# Patient Record
Sex: MALE | Race: WHITE | Employment: STUDENT | URBAN - METROPOLITAN AREA
[De-identification: names, ages, dates, MRNs, and addresses within clinical notes are randomized per-mention and may not be internally consistent; named-entity substitution may affect disease eponyms.]

---

## 2017-08-16 ENCOUNTER — HOSPITAL ENCOUNTER (OUTPATIENT)
Age: 22
Discharge: HOME | End: 2017-08-16
Attending: FAMILY MEDICINE
Payer: COMMERCIAL

## 2017-08-16 VITALS
TEMPERATURE: 98 F | HEIGHT: 73 IN | RESPIRATION RATE: 16 BRPM | OXYGEN SATURATION: 100 % | WEIGHT: 200 LBS | HEART RATE: 91 BPM | DIASTOLIC BLOOD PRESSURE: 85 MMHG | SYSTOLIC BLOOD PRESSURE: 146 MMHG | BODY MASS INDEX: 26.51 KG/M2

## 2017-08-16 DIAGNOSIS — J02.0 STREPTOCOCCAL SORE THROAT: Primary | ICD-10-CM

## 2017-08-16 LAB — S PYO AG THROAT QL: POSITIVE

## 2017-08-16 PROCEDURE — 87430 STREP A AG IA: CPT

## 2017-08-16 PROCEDURE — 99203 OFFICE O/P NEW LOW 30 MIN: CPT

## 2017-08-16 PROCEDURE — 99204 OFFICE O/P NEW MOD 45 MIN: CPT

## 2017-08-16 RX ORDER — AMOXICILLIN 875 MG/1
875 TABLET, COATED ORAL 2 TIMES DAILY
Qty: 20 TABLET | Refills: 0 | Status: SHIPPED | OUTPATIENT
Start: 2017-08-16 | End: 2017-08-26

## 2017-08-16 NOTE — ED PROVIDER NOTES
Patient Seen in: [address]    History   Patient presents with:  Cough/URI    Stated Complaint: coughing up blood, congested, sinus pressure, headache      Patient Seen in: [address] phaynx injected, mild  LUNGS: clear  CARDIO: RRR without murmur  EXTREMITIES: no cyanosis, clubbing or edema  GI: soft, non-tender, normal bowel sounds  SKIN: good skin turgor, no obvious rashes  Differential to include: URI vs. rhinonsinusitis vs. Bronchi from today and agreed except as otherwise stated in HPI.     Physical Exam   ED Triage Vitals [08/16/17 1033]  BP: 146/85  Pulse: 91  Resp: 16  Temp: 98 °F (36.7 °C)  Temp src: Oral  SpO2: 100 %  O2 Device: None (Room air)    Current:/85   Pulse 91

## 2017-08-16 NOTE — ED INITIAL ASSESSMENT (HPI)
Pt presents to the IC with c/o a cough, congestion, sore throat, having blood in his spit and having a headache. Pt notes back aches. Pt denies fever but notes chills today. Symptom onset was last night/ early this morning.

## 2019-07-30 ENCOUNTER — HOSPITAL ENCOUNTER (OUTPATIENT)
Age: 24
Discharge: HOME | End: 2019-07-30
Attending: FAMILY MEDICINE
Payer: COMMERCIAL

## 2019-07-30 ENCOUNTER — APPOINTMENT (OUTPATIENT)
Dept: GENERAL RADIOLOGY | Age: 24
End: 2019-07-30
Attending: FAMILY MEDICINE
Payer: COMMERCIAL

## 2019-07-30 VITALS
OXYGEN SATURATION: 100 % | DIASTOLIC BLOOD PRESSURE: 88 MMHG | TEMPERATURE: 98 F | HEART RATE: 83 BPM | WEIGHT: 200 LBS | BODY MASS INDEX: 26.51 KG/M2 | RESPIRATION RATE: 16 BRPM | SYSTOLIC BLOOD PRESSURE: 157 MMHG | HEIGHT: 73 IN

## 2019-07-30 DIAGNOSIS — J06.9 VIRAL URI WITH COUGH: Primary | ICD-10-CM

## 2019-07-30 LAB
#MXD IC: 0.8 X10ˆ3/UL (ref 0.1–1)
CREAT BLD-MCNC: 0.9 MG/DL (ref 0.7–1.3)
GLUCOSE BLD-MCNC: 105 MG/DL (ref 70–99)
HCT VFR BLD AUTO: 44 % (ref 39–53)
HGB BLD-MCNC: 15.3 G/DL (ref 13–17.5)
ISTAT BUN: 10 MG/DL (ref 8–20)
ISTAT CHLORIDE: 100 MMOL/L (ref 101–111)
ISTAT HEMATOCRIT: 45 % (ref 37–53)
ISTAT IONIZED CALCIUM FOR CHEM 8: 1.17 MMOL/L (ref 1.12–1.32)
ISTAT POTASSIUM: 3.7 MMOL/L (ref 3.6–5.1)
ISTAT SODIUM: 141 MMOL/L (ref 136–145)
ISTAT TCO2: 29 MMOL/L (ref 22–32)
LYMPHOCYTES # BLD AUTO: 1.2 X10ˆ3/UL (ref 1–4)
LYMPHOCYTES NFR BLD AUTO: 25.2 %
MCH RBC QN AUTO: 30.8 PG (ref 26–34)
MCHC RBC AUTO-ENTMCNC: 34.8 G/DL (ref 31–37)
MCV RBC AUTO: 88.5 FL (ref 80–100)
MIXED CELL %: 17.8 %
NEUTROPHILS # BLD AUTO: 2.7 X10ˆ3/UL (ref 1.5–7.7)
NEUTROPHILS NFR BLD AUTO: 57 %
PLATELET # BLD AUTO: 237 X10ˆ3/UL (ref 150–450)
RBC # BLD AUTO: 4.97 X10ˆ6/UL (ref 4.3–5.7)
S PYO AG THROAT QL: NEGATIVE
WBC # BLD AUTO: 4.7 X10ˆ3/UL (ref 4–11)

## 2019-07-30 PROCEDURE — 36415 COLL VENOUS BLD VENIPUNCTURE: CPT

## 2019-07-30 PROCEDURE — 80047 BASIC METABLC PNL IONIZED CA: CPT

## 2019-07-30 PROCEDURE — 85025 COMPLETE CBC W/AUTO DIFF WBC: CPT | Performed by: FAMILY MEDICINE

## 2019-07-30 PROCEDURE — 87430 STREP A AG IA: CPT

## 2019-07-30 PROCEDURE — 71046 X-RAY EXAM CHEST 2 VIEWS: CPT | Performed by: FAMILY MEDICINE

## 2019-07-30 PROCEDURE — 99214 OFFICE O/P EST MOD 30 MIN: CPT

## 2019-07-30 NOTE — ED PROVIDER NOTES
Patient presents with:  Cough/URI  Fever (infectious)      HPI:     Phuong Jimenez is a 25year old male, here with his mother who is a nurse, with for chief complaint of nasal congestion, sore throat, cough, fever, chest congestion, bloody nose, R sided c No other exudates. Eyes: Conjunctivae and EOM are normal. Pupils are equal, round, and reactive to light. No scleral icterus. Neck: Normal range of motion. Neck supple. No JVD present. No tracheal deviation. No significant adenopathy present.  No thyr GFR, -American 138 >=60     PROCEDURE:  XR CHEST PA + LAT CHEST (CPT=71020)     COMPARISON: None. INDICATIONS:  Cough and fever x 3 days. History of smoking. TECHNIQUE:    Two views.        FINDINGS:   CARDIAC/VASC:           No cardiac s